# Patient Record
Sex: FEMALE | Race: WHITE | NOT HISPANIC OR LATINO
[De-identification: names, ages, dates, MRNs, and addresses within clinical notes are randomized per-mention and may not be internally consistent; named-entity substitution may affect disease eponyms.]

---

## 2017-01-03 ENCOUNTER — APPOINTMENT (OUTPATIENT)
Dept: OBGYN | Facility: CLINIC | Age: 77
End: 2017-01-03

## 2017-01-04 ENCOUNTER — RESULT REVIEW (OUTPATIENT)
Age: 77
End: 2017-01-04

## 2017-01-05 ENCOUNTER — APPOINTMENT (OUTPATIENT)
Dept: HEMATOLOGY ONCOLOGY | Facility: CLINIC | Age: 77
End: 2017-01-05
Payer: MEDICARE

## 2017-01-05 VITALS
WEIGHT: 144.4 LBS | RESPIRATION RATE: 16 BRPM | SYSTOLIC BLOOD PRESSURE: 155 MMHG | BODY MASS INDEX: 28.2 KG/M2 | TEMPERATURE: 97.9 F | OXYGEN SATURATION: 95 % | HEART RATE: 68 BPM | DIASTOLIC BLOOD PRESSURE: 78 MMHG

## 2017-01-05 PROCEDURE — 99214 OFFICE O/P EST MOD 30 MIN: CPT

## 2017-01-05 RX ORDER — AMLODIPINE BESYLATE 5 MG/1
5 TABLET ORAL DAILY
Refills: 0 | Status: ACTIVE | COMMUNITY

## 2017-11-24 ENCOUNTER — OUTPATIENT (OUTPATIENT)
Dept: OUTPATIENT SERVICES | Facility: HOSPITAL | Age: 77
LOS: 1 days | Discharge: HOME | End: 2017-11-24

## 2017-11-24 DIAGNOSIS — Z85.3 PERSONAL HISTORY OF MALIGNANT NEOPLASM OF BREAST: ICD-10-CM

## 2017-11-27 ENCOUNTER — APPOINTMENT (OUTPATIENT)
Dept: SURGICAL ONCOLOGY | Facility: CLINIC | Age: 77
End: 2017-11-27
Payer: MEDICARE

## 2017-11-27 VITALS
RESPIRATION RATE: 16 BRPM | DIASTOLIC BLOOD PRESSURE: 68 MMHG | WEIGHT: 144 LBS | BODY MASS INDEX: 28.27 KG/M2 | OXYGEN SATURATION: 98 % | HEIGHT: 60 IN | SYSTOLIC BLOOD PRESSURE: 169 MMHG | HEART RATE: 69 BPM

## 2017-11-27 PROCEDURE — 99213 OFFICE O/P EST LOW 20 MIN: CPT

## 2017-11-30 ENCOUNTER — APPOINTMENT (OUTPATIENT)
Dept: HEMATOLOGY ONCOLOGY | Facility: CLINIC | Age: 77
End: 2017-11-30
Payer: MEDICARE

## 2017-11-30 VITALS
BODY MASS INDEX: 27.86 KG/M2 | WEIGHT: 142.64 LBS | OXYGEN SATURATION: 96 % | HEART RATE: 72 BPM | TEMPERATURE: 98.4 F | RESPIRATION RATE: 16 BRPM | SYSTOLIC BLOOD PRESSURE: 178 MMHG | DIASTOLIC BLOOD PRESSURE: 79 MMHG

## 2017-11-30 PROCEDURE — 99214 OFFICE O/P EST MOD 30 MIN: CPT

## 2018-11-15 ENCOUNTER — APPOINTMENT (OUTPATIENT)
Dept: SURGICAL ONCOLOGY | Facility: CLINIC | Age: 78
End: 2018-11-15
Payer: MEDICARE

## 2018-11-15 VITALS
RESPIRATION RATE: 15 BRPM | WEIGHT: 140 LBS | HEIGHT: 60 IN | HEART RATE: 78 BPM | BODY MASS INDEX: 27.48 KG/M2 | DIASTOLIC BLOOD PRESSURE: 77 MMHG | SYSTOLIC BLOOD PRESSURE: 158 MMHG

## 2018-11-15 PROCEDURE — 99213 OFFICE O/P EST LOW 20 MIN: CPT

## 2018-11-16 ENCOUNTER — OUTPATIENT (OUTPATIENT)
Dept: OUTPATIENT SERVICES | Facility: HOSPITAL | Age: 78
LOS: 1 days | Discharge: HOME | End: 2018-11-16

## 2018-11-16 DIAGNOSIS — Z85.3 PERSONAL HISTORY OF MALIGNANT NEOPLASM OF BREAST: ICD-10-CM

## 2018-11-28 ENCOUNTER — APPOINTMENT (OUTPATIENT)
Dept: HEMATOLOGY ONCOLOGY | Facility: CLINIC | Age: 78
End: 2018-11-28
Payer: MEDICARE

## 2018-11-28 VITALS
DIASTOLIC BLOOD PRESSURE: 72 MMHG | TEMPERATURE: 98 F | WEIGHT: 139.99 LBS | OXYGEN SATURATION: 99 % | HEART RATE: 68 BPM | SYSTOLIC BLOOD PRESSURE: 130 MMHG | BODY MASS INDEX: 27.34 KG/M2 | RESPIRATION RATE: 16 BRPM

## 2018-11-28 PROCEDURE — 99214 OFFICE O/P EST MOD 30 MIN: CPT

## 2018-11-28 NOTE — HISTORY OF PRESENT ILLNESS
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 On 5/21/97 she underwent right partial mastectomy with axillary lymph node dissection with pathology revealing lobular carcinoma in situ and focal atypical lobular hyperplasia with 1/20 lymph nodes positive for metastatic carcinoma; extent of disease workup was negative. 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  She was treated with adjuvant Adriamycin/Cytoxan x4 cycles followed by tamoxifen x5 years, then Femara x5 years, completed in October 2008. Since this time she has been followed by close observation. [de-identified] : infiltrating well-differentiated (tubular)carcinoma; microscopic focus of intraductal carcinoma; lobular carcinoma in situ. [de-identified] : CA 27.29 [de-identified] : 76 year old woman here for yearly follow-up for right breast cancer.  Her last office visit was on 12/10/16. She feels very well without any complaints. Patient had a uterine polyp removed 12/21/2016.  Patient reports the pathology was benign.  She reports a good energy level and appetite without any recent weight loss. She denies any recent infections, fever or chills.  She denies any chest pain, SOB, abdominal pain or new bony pain.  She denies any vaginal bleeding.  \par \par She reports she had a complete metabolic profile in 12/2016 prior to surgery.  She is up-to-date with bone density (9/2016), flu shot (10/2016), Pneumovax (7/2015).  She recently saw Dr. Baltazar (11/2016).  She sees PMD every 3 months. She sees gynecology once per year and receives yearly pelvic sonograms (last 10/2016).    \par \par SInce office visit on 1/5/17, she had followup with breast surgeon on 11/27/17. She has good energy level and appetite. She follows with PCP every 3 months. She sees Gynecologist yearly. No fever, shortness of breath, abdominal pain.\par \par Since last office visit on 11/30/17, the patient continues to be monitored by PCP. She had recent lab work on 11/19/18. She has good energy level and good appetite. She was evaluated by breast surgeon on 11/15/18. She denies fever, cough, shortness of breath. She see Gyn yearly. Bone density ordered by PCP.

## 2018-11-28 NOTE — ASSESSMENT
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 On 5/21/97 she underwent right partial mastectomy with axillary lymph node dissection with pathology revealing lobular carcinoma in situ and focal atypical lobular hyperplasia with 1/20 lymph nodes positive for metastatic carcinoma; extent of disease workup was negative. 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 She was treated with adjuvant Adriamycin/Cytoxan x4 cycles followed by tamoxifen x5 years, then Femara x5 years, completed in October 2008. Since this time she has been followed by close observation.\par \par Plan-Continue to observe for evidence of breast cancer recurrence.\par Followup with gynecology yearly.\par Followup with breast surgeon yearly.\par Continue current medications.\par I previously discussed with the patient that there is no longer clinical indication to monitor her tumor markers in the future. She has labwork done at PCP office and follows every 3 months. No need to check CMP at this office.\par Schedule followup office visit in one year. \par \par \par

## 2018-11-28 NOTE — PHYSICAL EXAM
[Normal] : no peripheral adenopathy appreciated [de-identified] : anicteric [de-identified] : RRR, normal S1S2 [de-identified] : warm, no pitting edema [de-identified] : status post bilateral mastectomies with silicone implants, no chest wall lesions

## 2018-11-28 NOTE — REVIEW OF SYSTEMS
[Joint Pain] : joint pain [Joint Stiffness] : joint stiffness [Fever] : no fever [Fatigue] : no fatigue [Mucosal Pain] : no mucosal pain [Lower Ext Edema] : no lower extremity edema [Shortness Of Breath] : no shortness of breath [Cough] : no cough [Abdominal Pain] : no abdominal pain [Vomiting] : no vomiting [Diarrhea] : no diarrhea [Skin Rash] : no skin rash [Dizziness] : no dizziness [Fainting] : no fainting [FreeTextEntry7] : Good appetite [FreeTextEntry9] : mild in fingers, both knees, sometimes ankles secondary to arthritis

## 2019-11-25 ENCOUNTER — APPOINTMENT (OUTPATIENT)
Dept: SURGICAL ONCOLOGY | Facility: CLINIC | Age: 79
End: 2019-11-25
Payer: MEDICARE

## 2019-11-25 VITALS
RESPIRATION RATE: 15 BRPM | SYSTOLIC BLOOD PRESSURE: 153 MMHG | WEIGHT: 140 LBS | HEART RATE: 74 BPM | DIASTOLIC BLOOD PRESSURE: 71 MMHG | BODY MASS INDEX: 27.48 KG/M2 | HEIGHT: 60 IN

## 2019-11-25 PROCEDURE — 99213 OFFICE O/P EST LOW 20 MIN: CPT

## 2019-11-25 NOTE — ASSESSMENT
[FreeTextEntry1] : 2010 PET scan was unremarkable.\par \par She continues to do well clinically.\par \par Since asymptomatic, no additional imaging presently warranted.\par \par We will see her in another year, sooner if needed

## 2019-11-25 NOTE — HISTORY OF PRESENT ILLNESS
[de-identified] : ***Now lives in [place]\par \par 79 year-old lady being seen for followup of RIGHT BREAST CANCER\par \par She is asymptomatic\par \par March 1997: right breast conserving surgery for LN+ stage II cancer.\par Her adjuvant chemotherapy was overseen by Dr. Diann Ram. \par Followed by anti-estrogen therapy\par Her medical oncologist is now Dr. 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 Mir López.\par \par She is BRCA negative.\par \par + Ashkenazi heritage.\par \par No relatives with breast OR ovarian cancer.\par \par Her menarche was at age 13. \par She's had 2 pregnancies, both delivered, the first is 26\par \par Her internist is Dr Lexie NAJERA.\par \par She does not have a pacemaker defibrillator.\par She takes no anticoagulants.\par \par She takes a baby aspirin daily.\par Hypertension is managed with Diovan, metoprolol, Norvasc, and HCTZ\par Lipitor to control hyperlipidemia\par \par GYN: Saw Dr. Tyler MONROE for followup - July 2018 okay\par Her gynecologist was Dr. Cosmo Chavez, a visit in October 2016 led the diagnosis of the uterine polyp, was addressed by  Dr. Kelton Castillo\par \par \par Colonoscopy in May 2016, when she was 76, by Dr. Najera was unremarkable.

## 2019-11-25 NOTE — PHYSICAL EXAM
[Normal] : supple, no neck mass and thyroid not enlarged [Normal Neck Lymph Nodes] : normal neck lymph nodes  [Normal Supraclavicular Lymph Nodes] : normal supraclavicular lymph nodes [Normal Axillary Lymph Nodes] : normal axillary lymph nodes [Normal] : normal appearance, no rash, nodules, vesicles, ulcers, erythema [de-identified] : below [de-identified] : Groins not examined

## 2019-11-25 NOTE — REASON FOR VISIT
[Other: _____] : [unfilled] [Follow-Up Visit] : a follow-up visit for [FreeTextEntry2] : Right breast cancer

## 2019-11-26 ENCOUNTER — OUTPATIENT (OUTPATIENT)
Dept: OUTPATIENT SERVICES | Facility: HOSPITAL | Age: 79
LOS: 1 days | Discharge: HOME | End: 2019-11-26

## 2019-11-26 DIAGNOSIS — Z85.3 PERSONAL HISTORY OF MALIGNANT NEOPLASM OF BREAST: ICD-10-CM

## 2019-12-26 ENCOUNTER — APPOINTMENT (OUTPATIENT)
Dept: HEMATOLOGY ONCOLOGY | Facility: CLINIC | Age: 79
End: 2019-12-26
Payer: MEDICARE

## 2019-12-26 VITALS
OXYGEN SATURATION: 96 % | HEART RATE: 71 BPM | SYSTOLIC BLOOD PRESSURE: 152 MMHG | DIASTOLIC BLOOD PRESSURE: 71 MMHG | RESPIRATION RATE: 16 BRPM | BODY MASS INDEX: 26.69 KG/M2 | TEMPERATURE: 97.9 F | WEIGHT: 136.68 LBS

## 2019-12-26 PROCEDURE — 99214 OFFICE O/P EST MOD 30 MIN: CPT

## 2019-12-26 NOTE — HISTORY OF PRESENT ILLNESS
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 On 5/21/97 she underwent right partial mastectomy with axillary lymph node dissection with pathology revealing lobular carcinoma in situ and focal atypical lobular hyperplasia with 1/20 lymph nodes positive for metastatic carcinoma; extent of disease workup was negative. 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  She was treated with adjuvant Adriamycin/Cytoxan x4 cycles followed by tamoxifen x5 years, then Femara x5 years, completed in October 2008. Since this time she has been followed by close observation. [de-identified] : infiltrating well-differentiated (tubular)carcinoma; microscopic focus of intraductal carcinoma; lobular carcinoma in situ. [de-identified] : CA 27.29 [de-identified] : 79 year old woman here for yearly follow-up for right breast cancer.  Her last office visit was on 12/10/16. She feels very well without any complaints. Patient had a uterine polyp removed 12/21/2016.  Patient reports the pathology was benign.  She reports a good energy level and appetite without any recent weight loss. She denies any recent infections, fever or chills.  She denies any chest pain, SOB, abdominal pain or new bony pain.  She denies any vaginal bleeding.  \par \par She reports she had a complete metabolic profile in 12/2016 prior to surgery.  She is up-to-date with bone density (9/2016), flu shot (10/2016), Pneumovax (7/2015).  She recently saw Dr. Baltazar (11/2016).  She sees PMD every 3 months. She sees gynecology once per year and receives yearly pelvic sonograms (last 10/2016).    \par \par SInce office visit on 1/5/17, she had followup with breast surgeon on 11/27/17. She has good energy level and appetite. She follows with PCP every 3 months. She sees Gynecologist yearly. No fever, shortness of breath, abdominal pain.\par \par Since office visit on 11/30/17, the patient continues to be monitored by PCP. She had recent lab work on 11/19/18. She has good energy level and good appetite. She was evaluated by breast surgeon on 11/15/18. She denies fever, cough, shortness of breath. She see Gyn yearly. Bone density ordered by PCP. \par \par Since last office visit on 11/28/18, the patient continues to be monitored by PCP every 3 months except when in [place]. She had recent lab work on 9/19/19, CBC with diff in normal range. She was evaluated by breast surgeon on 11/25/19. She has good energy level and good appetite. She denies fever, cough, shortness of breath. She see Gyn yearly, last on 10/14/19. Bone density ordered by PCP.

## 2019-12-26 NOTE — PHYSICAL EXAM
[Normal] : no peripheral adenopathy appreciated [de-identified] : anicteric [de-identified] : RRR, normal S1S2 [de-identified] : warm, no pitting edema [de-identified] : status post bilateral mastectomies with silicone implants, no chest wall lesions

## 2019-12-26 NOTE — REVIEW OF SYSTEMS
[Joint Pain] : joint pain [Joint Stiffness] : joint stiffness [Fever] : no fever [Fatigue] : no fatigue [Lower Ext Edema] : no lower extremity edema [Mucosal Pain] : no mucosal pain [Abdominal Pain] : no abdominal pain [Cough] : no cough [Shortness Of Breath] : no shortness of breath [Vomiting] : no vomiting [Diarrhea] : no diarrhea [Skin Rash] : no skin rash [Dizziness] : no dizziness [Fainting] : no fainting [FreeTextEntry7] : Good appetite [FreeTextEntry9] : mild in fingers, both knees, sometimes ankles secondary to arthritis

## 2019-12-26 NOTE — ASSESSMENT
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 On 5/21/97 she underwent right partial mastectomy with axillary lymph node dissection with pathology revealing lobular carcinoma in situ and focal atypical lobular hyperplasia with 1/20 lymph nodes positive for metastatic carcinoma; extent of disease workup was negative. 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 She was treated with adjuvant Adriamycin/Cytoxan x4 cycles followed by tamoxifen x5 years, then Femara x5 years, completed in October 2008. Since this time she has been followed by close observation.\par \par Plan-Continue to observe for evidence of breast cancer recurrence.\par Followup with gynecology yearly.\par Followup with breast surgeon yearly.\par Continue current medications.\par I previously discussed with the patient that there is no longer clinical indication to monitor her tumor markers in the future. She has labwork done at PCP office and follows every 3 months. No need to check CMP at this office.\par Schedule followup office visit in one year. \par \par \par

## 2021-06-21 ENCOUNTER — APPOINTMENT (OUTPATIENT)
Dept: SURGICAL ONCOLOGY | Facility: CLINIC | Age: 81
End: 2021-06-21
Payer: MEDICARE

## 2021-06-21 VITALS
BODY MASS INDEX: 26.68 KG/M2 | SYSTOLIC BLOOD PRESSURE: 153 MMHG | DIASTOLIC BLOOD PRESSURE: 74 MMHG | RESPIRATION RATE: 16 BRPM | HEART RATE: 71 BPM | WEIGHT: 145 LBS | OXYGEN SATURATION: 95 % | HEIGHT: 62 IN

## 2021-06-21 PROCEDURE — 99213 OFFICE O/P EST LOW 20 MIN: CPT

## 2021-06-21 NOTE — PHYSICAL EXAM
[Normal] : supple, no neck mass and thyroid not enlarged [Normal Neck Lymph Nodes] : normal neck lymph nodes  [Normal Supraclavicular Lymph Nodes] : normal supraclavicular lymph nodes [Normal Axillary Lymph Nodes] : normal axillary lymph nodes [Normal] : normal appearance, no rash, nodules, vesicles, ulcers, erythema [de-identified] : Groins not examined [de-identified] : below

## 2021-06-21 NOTE — HISTORY OF PRESENT ILLNESS
[de-identified] : ***Now lives in [place].\par \par Last seen November 2019.\par \par \par 80 year-old lady being seen for followup of RIGHT BREAST CANCER\par \par She is asymptomatic\par \par March 1997: right breast conserving surgery for LN+ stage II cancer.\par + Adjuvant chemotherapy: Dr. Diann Ram. \par Followed by anti-estrogen therapy\par Her medical oncologist is now Dr. 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 Mir López.\par \par She is BRCA negative.\par \par + Ashkenazi heritage.\par \par No relatives with breast OR ovarian cancer.\par \par \par \par Her menarche was at age 13. \par She's had 2 pregnancies, both delivered, the first is 25\par \par \par Her internist is Dr Lexie NAJERA.\par \par She does not have a pacemaker defibrillator.\par She takes no anticoagulants.\par \par She takes a baby aspirin daily.\par Hypertension is managed with Diovan, metoprolol, Norvasc, and HCTZ\par Lipitor to control hyperlipidemia\par \par \par GYN: Saw Dr. Tyler MONROE for followup - July 2018 okay\par Her gynecologist was Dr. Cosmo Chavez, a visit in October 2016 led the diagnosis of the uterine polyp, was addressed by  Dr. Kelton Castillo.\par She will be scheduling a follow-up visit\par \par \par Colonoscopy in May 2016, when she was 76, by Dr. Najera was unremarkable.\par No specific follow-up needed since she is asymptomatic, over 76, without a personal or family history of CRC

## 2021-06-21 NOTE — REASON FOR VISIT
[Follow-Up Visit] : a follow-up visit for [Other: _____] : [unfilled] [FreeTextEntry2] : Right breast cancer treated initially with breast conservation, then subsequent bilateral mastectomies

## 2021-06-21 NOTE — ASSESSMENT
[FreeTextEntry1] : 2010 PET scan was unremarkable.\par \par \par I suggested bilateral breast ultrasounds for surveillance of her implants.\par Prescriptions entered.\par \par \par She continues to do well clinically.\par \par Since asymptomatic, no additional imaging presently warranted.\par \par We will see her in another year, sooner if needed\par \par

## 2021-07-01 ENCOUNTER — RESULT REVIEW (OUTPATIENT)
Age: 81
End: 2021-07-01

## 2021-07-01 ENCOUNTER — OUTPATIENT (OUTPATIENT)
Dept: OUTPATIENT SERVICES | Facility: HOSPITAL | Age: 81
LOS: 1 days | End: 2021-07-01
Payer: MEDICARE

## 2021-07-01 ENCOUNTER — APPOINTMENT (OUTPATIENT)
Dept: ULTRASOUND IMAGING | Facility: CLINIC | Age: 81
End: 2021-07-01
Payer: MEDICARE

## 2021-07-01 DIAGNOSIS — Z00.8 ENCOUNTER FOR OTHER GENERAL EXAMINATION: ICD-10-CM

## 2021-07-01 PROCEDURE — 76641 ULTRASOUND BREAST COMPLETE: CPT

## 2021-07-01 PROCEDURE — 76641 ULTRASOUND BREAST COMPLETE: CPT | Mod: 26,50

## 2021-08-25 ENCOUNTER — OUTPATIENT (OUTPATIENT)
Dept: OUTPATIENT SERVICES | Facility: HOSPITAL | Age: 81
LOS: 1 days | Discharge: HOME | End: 2021-08-25

## 2021-08-25 DIAGNOSIS — Z85.3 PERSONAL HISTORY OF MALIGNANT NEOPLASM OF BREAST: ICD-10-CM

## 2021-08-26 ENCOUNTER — RESULT REVIEW (OUTPATIENT)
Age: 81
End: 2021-08-26

## 2021-08-26 ENCOUNTER — APPOINTMENT (OUTPATIENT)
Dept: HEMATOLOGY ONCOLOGY | Facility: CLINIC | Age: 81
End: 2021-08-26
Payer: MEDICARE

## 2021-08-26 VITALS
HEIGHT: 62.01 IN | HEART RATE: 68 BPM | TEMPERATURE: 97.7 F | SYSTOLIC BLOOD PRESSURE: 154 MMHG | BODY MASS INDEX: 25.8 KG/M2 | WEIGHT: 141.98 LBS | OXYGEN SATURATION: 95 % | DIASTOLIC BLOOD PRESSURE: 71 MMHG | RESPIRATION RATE: 16 BRPM

## 2021-08-26 LAB
BASOPHILS # BLD AUTO: 0.1 K/UL — SIGNIFICANT CHANGE UP (ref 0–0.2)
BASOPHILS NFR BLD AUTO: 1 % — SIGNIFICANT CHANGE UP (ref 0–2)
EOSINOPHIL # BLD AUTO: 0.26 K/UL — SIGNIFICANT CHANGE UP (ref 0–0.5)
EOSINOPHIL NFR BLD AUTO: 2.6 % — SIGNIFICANT CHANGE UP (ref 0–6)
HCT VFR BLD CALC: 41.4 % — SIGNIFICANT CHANGE UP (ref 34.5–45)
HGB BLD-MCNC: 14.2 G/DL — SIGNIFICANT CHANGE UP (ref 11.5–15.5)
IMM GRANULOCYTES NFR BLD AUTO: 0.9 % — SIGNIFICANT CHANGE UP (ref 0–1.5)
LYMPHOCYTES # BLD AUTO: 2.41 K/UL — SIGNIFICANT CHANGE UP (ref 1–3.3)
LYMPHOCYTES # BLD AUTO: 24.2 % — SIGNIFICANT CHANGE UP (ref 13–44)
MCHC RBC-ENTMCNC: 30.7 PG — SIGNIFICANT CHANGE UP (ref 27–34)
MCHC RBC-ENTMCNC: 34.3 G/DL — SIGNIFICANT CHANGE UP (ref 32–36)
MCV RBC AUTO: 89.6 FL — SIGNIFICANT CHANGE UP (ref 80–100)
MONOCYTES # BLD AUTO: 1.13 K/UL — HIGH (ref 0–0.9)
MONOCYTES NFR BLD AUTO: 11.3 % — SIGNIFICANT CHANGE UP (ref 2–14)
NEUTROPHILS # BLD AUTO: 5.97 K/UL — SIGNIFICANT CHANGE UP (ref 1.8–7.4)
NEUTROPHILS NFR BLD AUTO: 60 % — SIGNIFICANT CHANGE UP (ref 43–77)
NRBC # BLD: 0 /100 WBCS — SIGNIFICANT CHANGE UP (ref 0–0)
PLATELET # BLD AUTO: 277 K/UL — SIGNIFICANT CHANGE UP (ref 150–400)
RBC # BLD: 4.62 M/UL — SIGNIFICANT CHANGE UP (ref 3.8–5.2)
RBC # FLD: 13.1 % — SIGNIFICANT CHANGE UP (ref 10.3–14.5)
WBC # BLD: 9.96 K/UL — SIGNIFICANT CHANGE UP (ref 3.8–10.5)
WBC # FLD AUTO: 9.96 K/UL — SIGNIFICANT CHANGE UP (ref 3.8–10.5)

## 2021-08-26 PROCEDURE — 99213 OFFICE O/P EST LOW 20 MIN: CPT

## 2021-08-26 NOTE — HISTORY OF PRESENT ILLNESS
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 On 5/21/97 she underwent right partial mastectomy with axillary lymph node dissection with pathology revealing lobular carcinoma in situ and focal atypical lobular hyperplasia with 1/20 lymph nodes positive for metastatic carcinoma; extent of disease workup was negative. 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  She was treated with adjuvant Adriamycin/Cytoxan x4 cycles followed by tamoxifen x5 years, then Femara x5 years, completed in October 2008. Since this time she has been followed by close observation. [de-identified] : infiltrating well-differentiated (tubular)carcinoma; microscopic focus of intraductal carcinoma; lobular carcinoma in situ. [de-identified] : CA 27.29 [de-identified] : 81 year old woman here for yearly follow-up for right breast cancer.  Her last office visit was on 12/10/16. She feels very well without any complaints. Patient had a uterine polyp removed 12/21/2016.  Patient reports the pathology was benign.  She reports a good energy level and appetite without any recent weight loss. She denies any recent infections, fever or chills.  She denies any chest pain, SOB, abdominal pain or new bony pain.  She denies any vaginal bleeding.  \par \par She reports she had a complete metabolic profile in 12/2016 prior to surgery.  She is up-to-date with bone density (9/2016), flu shot (10/2016), Pneumovax (7/2015).  She recently saw Dr. Baltazar (11/2016).  She sees PMD every 3 months. She sees gynecology once per year and receives yearly pelvic sonograms (last 10/2016).    \par \par SInce office visit on 1/5/17, she had followup with breast surgeon on 11/27/17. She has good energy level and appetite. She follows with PCP every 3 months. She sees Gynecologist yearly. No fever, shortness of breath, abdominal pain.\par \par Since office visit on 11/30/17, the patient continues to be monitored by PCP. She had recent lab work on 11/19/18. She has good energy level and good appetite. She was evaluated by breast surgeon on 11/15/18. She denies fever, cough, shortness of breath. She see Gyn yearly. Bone density ordered by PCP. \par \par Since office visit on 11/28/18, the patient continues to be monitored by PCP every 3 months except when in [place]. She had recent lab work on 9/19/19, CBC with diff in normal range. She was evaluated by breast surgeon on 11/25/19. She has good energy level and good appetite. She denies fever, cough, shortness of breath. She see Gyn yearly, last on 10/14/19. Bone density ordered by PCP. \par \par Since last office visit on 12/26/19, she has good energy level and good appetite. She denies fever, cough, shortness of breath. Seen by Surg Onc on 6/21/21.\par Seen by Gyn on 8/3/21 and had pap, she states exam was normal. Colonoscopy in May 2016, when she was 76, by Dr. Najera was unremarkable.No specific follow-up needed since she is asymptomatic, over 76, without a personal or family history of CRC. She had bone density a few weeks ago. \par

## 2021-08-26 NOTE — ASSESSMENT
[FreeTextEntry1] : \par The patient is a 81-year-old woman diagnosed with stage II infiltrating ductal carcinoma of the right breast, ER positive, [place] positive in May 1997, initial breast biopsy on 5/1/97 revealing infiltrating well-differentiated carcinoma (0.9 cm). On 5/21/97 she underwent right partial mastectomy with axillary lymph node dissection with pathology revealing lobular carcinoma in situ and focal atypical lobular hyperplasia with 1/20 lymph nodes positive for metastatic carcinoma; extent of disease workup was negative. 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 She was treated with adjuvant Adriamycin/Cytoxan x4 cycles followed by tamoxifen x5 years, then Femara x5 years, completed in October 2008. Since this time she has been followed by close observation.\par \par Plan-Continue to observe for evidence of breast cancer recurrence.\par Followup with gynecology yearly.\par Followup with breast surgeon yearly.\par Continue current medications.\par I previously discussed with the patient that there is no longer clinical indication to monitor her tumor markers in the future. She has labwork done at PCP office and follows every 3 months. No need to check CMP at this office.\par Schedule followup office visit in one year. \par \par \par

## 2021-08-26 NOTE — REVIEW OF SYSTEMS
[Joint Pain] : joint pain [Joint Stiffness] : joint stiffness [Fever] : no fever [Chills] : no chills [Fatigue] : no fatigue [Mucosal Pain] : no mucosal pain [Lower Ext Edema] : no lower extremity edema [Shortness Of Breath] : no shortness of breath [Cough] : no cough [Abdominal Pain] : no abdominal pain [Vomiting] : no vomiting [Diarrhea] : no diarrhea [Skin Rash] : no skin rash [Dizziness] : no dizziness [Fainting] : no fainting [Easy Bleeding] : no tendency for easy bleeding [Easy Bruising] : no tendency for easy bruising [FreeTextEntry7] : Good appetite [FreeTextEntry9] : mild in fingers, both knees, sometimes ankles secondary to arthritis

## 2021-08-26 NOTE — PHYSICAL EXAM
[Normal] : no peripheral adenopathy appreciated [de-identified] : anicteric [de-identified] : RRR, normal S1S2 [de-identified] : warm, no pitting edema [de-identified] : status post bilateral mastectomies with silicone implants, no chest wall lesions

## 2022-06-26 RX ORDER — HYDROCHLOROTHIAZIDE 12.5 MG/1
12.5 CAPSULE ORAL
Qty: 90 | Refills: 0 | Status: ACTIVE | COMMUNITY
Start: 2022-04-08

## 2022-06-27 ENCOUNTER — APPOINTMENT (OUTPATIENT)
Dept: SURGICAL ONCOLOGY | Facility: CLINIC | Age: 82
End: 2022-06-27

## 2022-06-27 VITALS
BODY MASS INDEX: 25.21 KG/M2 | SYSTOLIC BLOOD PRESSURE: 152 MMHG | OXYGEN SATURATION: 96 % | WEIGHT: 137 LBS | DIASTOLIC BLOOD PRESSURE: 75 MMHG | HEIGHT: 62 IN | HEART RATE: 74 BPM | RESPIRATION RATE: 16 BRPM | TEMPERATURE: 96.8 F

## 2022-06-27 PROCEDURE — 99214 OFFICE O/P EST MOD 30 MIN: CPT

## 2022-06-27 NOTE — REASON FOR VISIT
[Follow-Up Visit] : a follow-up visit for [Other: _____] : [unfilled] [FreeTextEntry2] : Right breast cancer treated with bilateral mastectomies

## 2022-06-27 NOTE — ASSESSMENT
[FreeTextEntry1] : 2010 PET scan was unremarkable.\par \par \par July 2021:\par Bilateral breast ultrasound at 450: BI-RADS 2.\par Small amount of fluid around the implants.\par We will repeat presently\par Prescriptions entered.\par \par \par She continues to do well clinically.\par \par \par We will see her in another year, sooner if needed\par \par

## 2022-06-27 NOTE — PHYSICAL EXAM
[Normal] : supple, no neck mass and thyroid not enlarged [Normal Neck Lymph Nodes] : normal neck lymph nodes  [Normal Supraclavicular Lymph Nodes] : normal supraclavicular lymph nodes [Normal Axillary Lymph Nodes] : normal axillary lymph nodes [Normal] : normal appearance, no rash, nodules, vesicles, ulcers, erythema [de-identified] : Groins not examined [de-identified] : below

## 2022-06-27 NOTE — HISTORY OF PRESENT ILLNESS
[de-identified] : ***Now lives in [place].\par \par 81 year-old lady being seen for followup of RIGHT BREAST CANCER\par \par She is asymptomatic\par \par March 1997: right breast conserving surgery for LN+ stage II cancer.\par + Adjuvant chemotherapy: Dr. Diann Ram. \par Followed by anti-estrogen therapy\par Her medical oncologist is now Dr. 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 Mir López.\par \par She is BRCA negative.\par \par + Ashkenazi heritage.\par \par No relatives with breast OR ovarian cancer.\par \par \par \par Her menarche was at age 13. \par She's had 2 pregnancies, both delivered, the first is 26\par \par \par Her internist is Dr Lexie NAJERA.\par \par She does not have a pacemaker defibrillator.\par She takes no anticoagulants.\par \par She takes a baby aspirin daily.\par Hypertension is managed with Diovan, metoprolol, Norvasc, and HCTZ\par Lipitor to control hyperlipidemia.\par \par Cardiology: Dr. Riki NAJERA\par \par \par GYN: Saw Dr. Tyler MONROE for followup -August 2021 okay\par Her gynecologist was Dr. Cosmo Chavez, a visit in October 2016 led the diagnosis of the uterine polyp, was addressed by  Dr. Kelton Castillo.\par \par \par \par Colonoscopy in May 2016, when she was 76, by Dr. Najera was unremarkable.\par No specific follow-up needed since she is asymptomatic, over 76, without a personal or family history of CRC

## 2022-07-18 ENCOUNTER — RESULT REVIEW (OUTPATIENT)
Age: 82
End: 2022-07-18

## 2022-07-19 ENCOUNTER — APPOINTMENT (OUTPATIENT)
Dept: ULTRASOUND IMAGING | Facility: CLINIC | Age: 82
End: 2022-07-19

## 2022-07-19 ENCOUNTER — OUTPATIENT (OUTPATIENT)
Dept: OUTPATIENT SERVICES | Facility: HOSPITAL | Age: 82
LOS: 1 days | End: 2022-07-19
Payer: COMMERCIAL

## 2022-07-19 DIAGNOSIS — Z85.3 PERSONAL HISTORY OF MALIGNANT NEOPLASM OF BREAST: ICD-10-CM

## 2022-07-19 PROCEDURE — 76641 ULTRASOUND BREAST COMPLETE: CPT

## 2022-07-19 PROCEDURE — 76641 ULTRASOUND BREAST COMPLETE: CPT | Mod: 26,50

## 2022-08-24 ENCOUNTER — OUTPATIENT (OUTPATIENT)
Dept: OUTPATIENT SERVICES | Facility: HOSPITAL | Age: 82
LOS: 1 days | Discharge: HOME | End: 2022-08-24

## 2022-08-24 DIAGNOSIS — Z85.3 PERSONAL HISTORY OF MALIGNANT NEOPLASM OF BREAST: ICD-10-CM

## 2022-08-25 ENCOUNTER — APPOINTMENT (OUTPATIENT)
Dept: HEMATOLOGY ONCOLOGY | Facility: CLINIC | Age: 82
End: 2022-08-25

## 2022-08-25 ENCOUNTER — RESULT REVIEW (OUTPATIENT)
Age: 82
End: 2022-08-25

## 2022-08-25 VITALS
SYSTOLIC BLOOD PRESSURE: 135 MMHG | DIASTOLIC BLOOD PRESSURE: 65 MMHG | OXYGEN SATURATION: 96 % | RESPIRATION RATE: 16 BRPM | TEMPERATURE: 97 F | WEIGHT: 138.89 LBS | HEART RATE: 64 BPM | BODY MASS INDEX: 25.4 KG/M2

## 2022-08-25 LAB
BASOPHILS # BLD AUTO: 0.08 K/UL — SIGNIFICANT CHANGE UP (ref 0–0.2)
BASOPHILS NFR BLD AUTO: 0.9 % — SIGNIFICANT CHANGE UP (ref 0–2)
EOSINOPHIL # BLD AUTO: 0.2 K/UL — SIGNIFICANT CHANGE UP (ref 0–0.5)
EOSINOPHIL NFR BLD AUTO: 2.3 % — SIGNIFICANT CHANGE UP (ref 0–6)
HCT VFR BLD CALC: 43.6 % — SIGNIFICANT CHANGE UP (ref 34.5–45)
HGB BLD-MCNC: 14.7 G/DL — SIGNIFICANT CHANGE UP (ref 11.5–15.5)
IMM GRANULOCYTES NFR BLD AUTO: 0.4 % — SIGNIFICANT CHANGE UP (ref 0–1.5)
LYMPHOCYTES # BLD AUTO: 2.18 K/UL — SIGNIFICANT CHANGE UP (ref 1–3.3)
LYMPHOCYTES # BLD AUTO: 25.5 % — SIGNIFICANT CHANGE UP (ref 13–44)
MCHC RBC-ENTMCNC: 30.8 PG — SIGNIFICANT CHANGE UP (ref 27–34)
MCHC RBC-ENTMCNC: 33.7 G/DL — SIGNIFICANT CHANGE UP (ref 32–36)
MCV RBC AUTO: 91.4 FL — SIGNIFICANT CHANGE UP (ref 80–100)
MONOCYTES # BLD AUTO: 0.87 K/UL — SIGNIFICANT CHANGE UP (ref 0–0.9)
MONOCYTES NFR BLD AUTO: 10.2 % — SIGNIFICANT CHANGE UP (ref 2–14)
NEUTROPHILS # BLD AUTO: 5.2 K/UL — SIGNIFICANT CHANGE UP (ref 1.8–7.4)
NEUTROPHILS NFR BLD AUTO: 60.7 % — SIGNIFICANT CHANGE UP (ref 43–77)
NRBC # BLD: 0 /100 WBCS — SIGNIFICANT CHANGE UP (ref 0–0)
PLATELET # BLD AUTO: 306 K/UL — SIGNIFICANT CHANGE UP (ref 150–400)
RBC # BLD: 4.77 M/UL — SIGNIFICANT CHANGE UP (ref 3.8–5.2)
RBC # FLD: 13.1 % — SIGNIFICANT CHANGE UP (ref 10.3–14.5)
WBC # BLD: 8.56 K/UL — SIGNIFICANT CHANGE UP (ref 3.8–10.5)
WBC # FLD AUTO: 8.56 K/UL — SIGNIFICANT CHANGE UP (ref 3.8–10.5)

## 2022-08-25 PROCEDURE — 99213 OFFICE O/P EST LOW 20 MIN: CPT

## 2022-08-25 NOTE — PHYSICAL EXAM
[Normal] : normoactive bowel sounds, soft and nontender, no hepatosplenomegaly or masses appreciated [de-identified] : anicteric [de-identified] : RRR, normal S1S2 [de-identified] : warm, no pitting edema [de-identified] : status post bilateral mastectomies with silicone implants, no chest wall lesions [de-identified] : no cervical/SCV/axillary adenopathy [de-identified] : A & O x 4

## 2022-08-25 NOTE — ASSESSMENT
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 On 5/21/97 she underwent right partial mastectomy with axillary lymph node dissection with pathology revealing lobular carcinoma in situ and focal atypical lobular hyperplasia with 1/20 lymph nodes positive for metastatic carcinoma; extent of disease workup was negative. 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 She was treated with adjuvant Adriamycin/Cytoxan x4 cycles followed by tamoxifen x5 years, then Femara x5 years, completed in October 2008. Since this time she has been followed by close observation.\par \par Plan-Continue to observe for evidence of breast cancer recurrence.\par Followup with gynecology yearly.\par Followup with breast surgeon yearly.\par Continue current medications.\par I previously discussed with the patient that there is no longer clinical indication to monitor her tumor markers in the future. She has labwork done at PCP office and follows every 3 months. No need to check labs at this office after today.\par Schedule followup office visit in one year. \par \par \par

## 2022-08-25 NOTE — REVIEW OF SYSTEMS
[Joint Pain] : joint pain [Joint Stiffness] : joint stiffness [Fever] : no fever [Chills] : no chills [Fatigue] : no fatigue [Mucosal Pain] : no mucosal pain [Lower Ext Edema] : no lower extremity edema [Shortness Of Breath] : no shortness of breath [Cough] : no cough [Abdominal Pain] : no abdominal pain [Vomiting] : no vomiting [Diarrhea] : no diarrhea [Skin Rash] : no skin rash [Dizziness] : no dizziness [Fainting] : no fainting [Easy Bleeding] : no tendency for easy bleeding [Easy Bruising] : no tendency for easy bruising [FreeTextEntry7] : Good appetite [FreeTextEntry9] : mild in fingers, both knees, sometimes ankles and feet secondary to arthritis

## 2022-08-25 NOTE — HISTORY OF PRESENT ILLNESS
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 On 5/21/97 she underwent right partial mastectomy with axillary lymph node dissection with pathology revealing lobular carcinoma in situ and focal atypical lobular hyperplasia with 1/20 lymph nodes positive for metastatic carcinoma; extent of disease workup was negative. 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  She was treated with adjuvant Adriamycin/Cytoxan x4 cycles followed by tamoxifen x5 years, then Femara x5 years, completed in October 2008. Since this time she has been followed by close observation. [de-identified] : infiltrating well-differentiated (tubular)carcinoma; microscopic focus of intraductal carcinoma; lobular carcinoma in situ. [de-identified] : CA 27.29 [de-identified] : 81 year old woman here for yearly follow-up for right breast cancer.  Her last office visit was on 12/10/16. She feels very well without any complaints. Patient had a uterine polyp removed 12/21/2016.  Patient reports the pathology was benign.  She reports a good energy level and appetite without any recent weight loss. She denies any recent infections, fever or chills.  She denies any chest pain, SOB, abdominal pain or new bony pain.  She denies any vaginal bleeding.  \par \par She reports she had a complete metabolic profile in 12/2016 prior to surgery.  She is up-to-date with bone density (9/2016), flu shot (10/2016), Pneumovax (7/2015).  She recently saw Dr. Baltazar (11/2016).  She sees PMD every 3 months. She sees gynecology once per year and receives yearly pelvic sonograms (last 10/2016).    \par \par SInce office visit on 1/5/17, she had followup with breast surgeon on 11/27/17. She has good energy level and appetite. She follows with PCP every 3 months. She sees Gynecologist yearly. No fever, shortness of breath, abdominal pain.\par \par Since office visit on 11/30/17, the patient continues to be monitored by PCP. She had recent lab work on 11/19/18. She has good energy level and good appetite. She was evaluated by breast surgeon on 11/15/18. She denies fever, cough, shortness of breath. She see Gyn yearly. Bone density ordered by PCP. \par \par Since office visit on 11/28/18, the patient continues to be monitored by PCP every 3 months except when in [place]. She had recent lab work on 9/19/19, CBC with diff in normal range. She was evaluated by breast surgeon on 11/25/19. She has good energy level and good appetite. She denies fever, cough, shortness of breath. She see Gyn yearly, last on 10/14/19. Bone density ordered by PCP. \par \par Since office visit on 12/26/19, she has good energy level and good appetite. She denies fever, cough, shortness of breath. Seen by Surg Onc on 6/21/21.\par Seen by Gyn on 8/3/21 and had pap, she states exam was normal. Colonoscopy in May 2016, when she was 76, by Dr. Najera was unremarkable.No specific follow-up needed since she is asymptomatic, over 76, without a personal or family history of CRC. She had bone density a few weeks ago. \par \par 08/25/22:\par Since last office visit on 8/26/21, she has good energy level and good appetite.  She underwent bilateral breast ultrasound(7/19/22)- no evidence of malignancy.  Seen by Surg Onc on 7/27/22. She has Gyn appointment in October 2022. She follows with PCP every 3 months, this is also her GI physician who informed her that colonoscopy not required- see history above. She denies fever, cough, shortness of breath. \par

## 2022-08-26 LAB
ALBUMIN SERPL ELPH-MCNC: 4.5 G/DL
ALP BLD-CCNC: 59 U/L
ALT SERPL-CCNC: 20 U/L
ANION GAP SERPL CALC-SCNC: 12 MMOL/L
AST SERPL-CCNC: 22 U/L
BILIRUB SERPL-MCNC: 1.2 MG/DL
BUN SERPL-MCNC: 20 MG/DL
CALCIUM SERPL-MCNC: 10.1 MG/DL
CHLORIDE SERPL-SCNC: 102 MMOL/L
CO2 SERPL-SCNC: 28 MMOL/L
CREAT SERPL-MCNC: 0.99 MG/DL
EGFR: 57 ML/MIN/1.73M2
GLUCOSE SERPL-MCNC: 107 MG/DL
POTASSIUM SERPL-SCNC: 3.9 MMOL/L
PROT SERPL-MCNC: 7.1 G/DL
SODIUM SERPL-SCNC: 142 MMOL/L

## 2023-07-19 ENCOUNTER — RESULT REVIEW (OUTPATIENT)
Age: 83
End: 2023-07-19

## 2023-07-19 ENCOUNTER — OUTPATIENT (OUTPATIENT)
Dept: OUTPATIENT SERVICES | Facility: HOSPITAL | Age: 83
LOS: 1 days | End: 2023-07-19
Payer: MEDICARE

## 2023-07-19 ENCOUNTER — APPOINTMENT (OUTPATIENT)
Dept: ULTRASOUND IMAGING | Facility: CLINIC | Age: 83
End: 2023-07-19
Payer: MEDICARE

## 2023-07-19 DIAGNOSIS — Z00.8 ENCOUNTER FOR OTHER GENERAL EXAMINATION: ICD-10-CM

## 2023-07-19 PROCEDURE — 76641 ULTRASOUND BREAST COMPLETE: CPT | Mod: 26,50,GY

## 2023-07-19 PROCEDURE — 76641 ULTRASOUND BREAST COMPLETE: CPT

## 2023-07-24 ENCOUNTER — APPOINTMENT (OUTPATIENT)
Dept: SURGICAL ONCOLOGY | Facility: CLINIC | Age: 83
End: 2023-07-24
Payer: MEDICARE

## 2023-07-24 VITALS
SYSTOLIC BLOOD PRESSURE: 161 MMHG | HEIGHT: 61 IN | DIASTOLIC BLOOD PRESSURE: 73 MMHG | HEART RATE: 58 BPM | RESPIRATION RATE: 16 BRPM | WEIGHT: 136 LBS | OXYGEN SATURATION: 96 % | BODY MASS INDEX: 25.68 KG/M2

## 2023-07-24 PROCEDURE — 99214 OFFICE O/P EST MOD 30 MIN: CPT

## 2023-07-24 RX ORDER — DIAZEPAM 5 MG/1
5 TABLET ORAL
Qty: 3 | Refills: 1 | Status: ACTIVE | COMMUNITY
Start: 2023-07-24 | End: 1900-01-01

## 2023-08-04 ENCOUNTER — APPOINTMENT (OUTPATIENT)
Dept: MRI IMAGING | Facility: CLINIC | Age: 83
End: 2023-08-04
Payer: MEDICARE

## 2023-08-04 PROCEDURE — A9585: CPT

## 2023-08-04 PROCEDURE — 77049 MRI BREAST C-+ W/CAD BI: CPT

## 2023-08-11 NOTE — HISTORY OF PRESENT ILLNESS
[de-identified] : ***Now lives in [place].\par \par 83 year-old lady being seen for followup of RIGHT BREAST CANCER\par \par She is asymptomatic\par \par March 1997: right breast conserving surgery for LN+ stage II cancer.\par + Adjuvant chemotherapy: Dr. Diann Ram. \par Followed by anti-estrogen therapy\par Her medical oncologist is now Dr. 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 Mir López.\par \par She is BRCA negative.\par \par + Ashkenazi heritage.\par \par No relatives with breast OR ovarian cancer.\par \par + Family history of malignancy.\par Father: Lymphoma.\par A paternal second cousin also had cancer, but no other details are available\par \par \par \par Her menarche was at age 13. \par She's had 2 pregnancies, both delivered, the first is 25\par \par \par Her internist is Dr Lexie NAEJRA.\par \par She does not have a pacemaker defibrillator.\par She takes no anticoagulants.\par \par She takes a baby aspirin daily.\par Hypertension is managed with Diovan, metoprolol, and Norvasc.\par Lipitor to control hyperlipidemia.\par \par Cardiology: Dr. Riki NAJERA\par \par \par GYN: Dr. Tyler MONROE\par July 2023 visit was unremarkable\par Her gynecologist was Dr. Cosmo Chavez, a visit in October 2016 led the diagnosis of the uterine polyp, was addressed by  Dr. Kelton Castillo.\par \par \par Colonoscopy in May 2016, when she was 76, by Dr. Najera was unremarkable.\par No specific follow-up needed since she is asymptomatic, over 76, without a personal or family history of CRC

## 2023-08-11 NOTE — ASSESSMENT
[FreeTextEntry1] : 2010 PET scan was unremarkable.   July 2023: Bilateral breast sonograms at [place]: BI-RADS 2. + Bilateral monica-implant fluid, right > left.  I have suggested a breast MRI. Prescription entered.  67   She continues to do well clinically.   We will see her in another year, sooner if needed   8/11/2023. She and I spoke on the phone. August 4, 2023, she had an MRI of her breast at [facility]: BI-RADS 2. There is a trace amount of fluid around her implants bilaterally, but nothing worrisome. She will continue annual follow-up with the Caregiver

## 2023-08-11 NOTE — PHYSICAL EXAM
[Normal] : supple, no neck mass and thyroid not enlarged [Normal Neck Lymph Nodes] : normal neck lymph nodes  [Normal Axillary Lymph Nodes] : normal axillary lymph nodes [Normal Supraclavicular Lymph Nodes] : normal supraclavicular lymph nodes [Normal] : normal appearance, no rash, nodules, vesicles, ulcers, erythema [de-identified] : Groins not examined [de-identified] : below

## 2023-08-23 ENCOUNTER — OUTPATIENT (OUTPATIENT)
Dept: OUTPATIENT SERVICES | Facility: HOSPITAL | Age: 83
LOS: 1 days | Discharge: HOME | End: 2023-08-23

## 2023-08-23 DIAGNOSIS — Z85.3 PERSONAL HISTORY OF MALIGNANT NEOPLASM OF BREAST: ICD-10-CM

## 2023-08-31 ENCOUNTER — APPOINTMENT (OUTPATIENT)
Dept: HEMATOLOGY ONCOLOGY | Facility: CLINIC | Age: 83
End: 2023-08-31
Payer: MEDICARE

## 2023-08-31 VITALS
WEIGHT: 135 LBS | TEMPERATURE: 97.2 F | BODY MASS INDEX: 25.51 KG/M2 | OXYGEN SATURATION: 97 % | RESPIRATION RATE: 16 BRPM | HEART RATE: 51 BPM | DIASTOLIC BLOOD PRESSURE: 60 MMHG | SYSTOLIC BLOOD PRESSURE: 175 MMHG

## 2023-08-31 DIAGNOSIS — Z85.3 PERSONAL HISTORY OF MALIGNANT NEOPLASM OF BREAST: ICD-10-CM

## 2023-08-31 PROCEDURE — 99213 OFFICE O/P EST LOW 20 MIN: CPT

## 2023-08-31 NOTE — ASSESSMENT
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 On 5/21/97 she underwent right partial mastectomy with axillary lymph node dissection with pathology revealing lobular carcinoma in situ and focal atypical lobular hyperplasia with 1/20 lymph nodes positive for metastatic carcinoma; extent of disease workup was negative. 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 She was treated with adjuvant Adriamycin/Cytoxan x4 cycles followed by tamoxifen x5 years, then Femara x5 years, completed in October 2008. Since this time she has been followed by close observation.  Plan-Continue to observe for evidence of breast cancer recurrence. Followup with gynecology yearly. Followup with breast surgeon yearly. Continue current medications. I previously discussed with the patient that there is no longer clinical indication to monitor her tumor markers in the future. She has labwork done at PCP office and follows every 3 months. No need to check labs at this office after today. She will followup with Dr. Baltazar from now on. If necessary, she can also be followed in the breast cancer survivorship program at [facility].

## 2023-08-31 NOTE — PHYSICAL EXAM
[Normal] : pharynx is unremarkable, moist mucus membrane, no oral lesions [de-identified] : anicteric [de-identified] : RRR, normal S1S2 [de-identified] : warm, no pitting edema [de-identified] : status post bilateral mastectomies with silicone implants, no chest wall lesions [de-identified] : no cervical/SCV/axillary adenopathy [de-identified] : A & O x 4

## 2023-08-31 NOTE — HISTORY OF PRESENT ILLNESS
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 On 5/21/97 she underwent right partial mastectomy with axillary lymph node dissection with pathology revealing lobular carcinoma in situ and focal atypical lobular hyperplasia with 1/20 lymph nodes positive for metastatic carcinoma; extent of disease workup was negative. 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  She was treated with adjuvant Adriamycin/Cytoxan x4 cycles followed by tamoxifen x5 years, then Femara x5 years, completed in October 2008. Since this time she has been followed by close observation. [de-identified] : infiltrating well-differentiated (tubular)carcinoma; microscopic focus of intraductal carcinoma; lobular carcinoma in situ. [de-identified] : CA 27.29 [de-identified] : 83 year old woman here for yearly follow-up for right breast cancer.  Her last office visit was on 12/10/16. She feels very well without any complaints. Patient had a uterine polyp removed 12/21/2016.  Patient reports the pathology was benign.  She reports a good energy level and appetite without any recent weight loss. She denies any recent infections, fever or chills.  She denies any chest pain, SOB, abdominal pain or new bony pain.  She denies any vaginal bleeding.    She reports she had a complete metabolic profile in 12/2016 prior to surgery.  She is up-to-date with bone density (9/2016), flu shot (10/2016), Pneumovax (7/2015).  She recently saw Dr. Baltazar (11/2016).  She sees PMD every 3 months. She sees gynecology once per year and receives yearly pelvic sonograms (last 10/2016).      SInce office visit on 1/5/17, she had followup with breast surgeon on 11/27/17. She has good energy level and appetite. She follows with PCP every 3 months. She sees Gynecologist yearly. No fever, shortness of breath, abdominal pain.  Since office visit on 11/30/17, the patient continues to be monitored by PCP. She had recent lab work on 11/19/18. She has good energy level and good appetite. She was evaluated by breast surgeon on 11/15/18. She denies fever, cough, shortness of breath. She see Gyn yearly. Bone density ordered by PCP.   Since office visit on 11/28/18, the patient continues to be monitored by PCP every 3 months except when in [place]. She had recent lab work on 9/19/19, CBC with diff in normal range. She was evaluated by breast surgeon on 11/25/19. She has good energy level and good appetite. She denies fever, cough, shortness of breath. She see Gyn yearly, last on 10/14/19. Bone density ordered by PCP.   Since office visit on 12/26/19, she has good energy level and good appetite. She denies fever, cough, shortness of breath. Seen by Surg Onc on 6/21/21. Seen by Gyn on 8/3/21 and had pap, she states exam was normal. Colonoscopy in May 2016, when she was 76, by Dr. Najera was unremarkable.No specific follow-up needed since she is asymptomatic, over 76, without a personal or family history of CRC. She had bone density a few weeks ago.   08/25/22: Since last office visit on 8/26/21, she has good energy level and good appetite.  She underwent bilateral breast ultrasound(7/19/22)- no evidence of malignancy.  Seen by Surg Onc on 7/27/22. She has Gyn appointment in October 2022. She follows with PCP every 3 months, this is also her GI physician who informed her that colonoscopy not required- see history above. She denies fever, cough, shortness of breath.   08/31/23: She was seen by Surg Onc on 7/24/23, and sent for MRI Breasts on 8/4/23, per Surg Onc. She follows with Gyn yearly, went in June 2023. She sees her PCP every 3 months. She denies fever, cough, shortness of breath.

## 2023-08-31 NOTE — REVIEW OF SYSTEMS
[Joint Pain] : joint pain [Joint Stiffness] : joint stiffness [Fever] : no fever [Chills] : no chills [Fatigue] : no fatigue [Mucosal Pain] : no mucosal pain [Lower Ext Edema] : no lower extremity edema [Shortness Of Breath] : no shortness of breath [Cough] : no cough [Abdominal Pain] : no abdominal pain [Vomiting] : no vomiting [Skin Rash] : no skin rash [Dizziness] : no dizziness [Fainting] : no fainting [Easy Bleeding] : no tendency for easy bleeding [Easy Bruising] : no tendency for easy bruising [FreeTextEntry7] : Good appetite [FreeTextEntry9] : mild in fingers, both knees, sometimes ankles and feet secondary to arthritis

## 2024-09-05 ENCOUNTER — RESULT REVIEW (OUTPATIENT)
Age: 84
End: 2024-09-05

## 2024-09-05 ENCOUNTER — APPOINTMENT (OUTPATIENT)
Dept: ULTRASOUND IMAGING | Facility: CLINIC | Age: 84
End: 2024-09-05
Payer: MEDICARE

## 2024-09-05 ENCOUNTER — OUTPATIENT (OUTPATIENT)
Dept: OUTPATIENT SERVICES | Facility: HOSPITAL | Age: 84
LOS: 1 days | End: 2024-09-05
Payer: MEDICARE

## 2024-09-05 DIAGNOSIS — Z00.00 ENCOUNTER FOR GENERAL ADULT MEDICAL EXAMINATION WITHOUT ABNORMAL FINDINGS: ICD-10-CM

## 2024-09-05 PROCEDURE — 76641 ULTRASOUND BREAST COMPLETE: CPT

## 2024-09-05 PROCEDURE — 76641 ULTRASOUND BREAST COMPLETE: CPT | Mod: 26,50,3G

## 2024-09-08 ENCOUNTER — NON-APPOINTMENT (OUTPATIENT)
Age: 84
End: 2024-09-08

## 2024-09-08 PROBLEM — C50.911 MALIGNANT NEOPLASM OF RIGHT BREAST IN FEMALE, ESTROGEN RECEPTOR POSITIVE, UNSPECIFIED SITE OF BREAST: Status: ACTIVE | Noted: 2024-09-08

## 2024-09-09 ENCOUNTER — APPOINTMENT (OUTPATIENT)
Dept: SURGICAL ONCOLOGY | Facility: CLINIC | Age: 84
End: 2024-09-09
Payer: MEDICARE

## 2024-09-09 VITALS
HEIGHT: 61 IN | OXYGEN SATURATION: 96 % | DIASTOLIC BLOOD PRESSURE: 72 MMHG | WEIGHT: 141 LBS | BODY MASS INDEX: 26.62 KG/M2 | RESPIRATION RATE: 17 BRPM | HEART RATE: 60 BPM | SYSTOLIC BLOOD PRESSURE: 188 MMHG

## 2024-09-09 DIAGNOSIS — Z17.0 MALIGNANT NEOPLASM OF UNSPECIFIED SITE OF RIGHT FEMALE BREAST: ICD-10-CM

## 2024-09-09 DIAGNOSIS — C50.911 MALIGNANT NEOPLASM OF UNSPECIFIED SITE OF RIGHT FEMALE BREAST: ICD-10-CM

## 2024-09-09 PROCEDURE — 99214 OFFICE O/P EST MOD 30 MIN: CPT

## 2024-09-09 NOTE — HISTORY OF PRESENT ILLNESS
[de-identified] : ***Now lives in [place].  84-year-old lady being seen for follow-up of RIGHT BREAST CANCER.  She is asymptomatic  March 1997: right breast conserving surgery for LN+ stage II cancer. + Adjuvant chemotherapy: Dr. Diann Ram.  Followed by anti-estrogen therapy Her medical oncologist is now Dr. Aretha YORK.  October 1997: Because of her Ashkenazi heritage and the presence of LCIS on the index surgery she chose to have a completion right and a prophylactic left mastectomy, with reconstruction by Dr. Mir López.  She is BRCA negative.  + Ashkenazi heritage.  No relatives with breast OR ovarian cancer.  + Family history of malignancy. Father: Lymphoma. A paternal second cousin also had cancer, but no other details are available   Her menarche was at age 13.  She's had 2 pregnancies, both delivered, the first is 25.   Her internist is Dr Lexie NAJERA.  + ALLERGIC: Sulfa medications  She does not have a pacemaker defibrillator. She takes no anticoagulants.  She takes a baby aspirin daily. Hypertension is managed with Diovan, metoprolol, and Norvasc. Lipitor to control hyperlipidemia.  Cardiology: Dr. Riki NAJERA.  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 Her internist has recommended a neurologist. She will be scheduling a consultation.   GYN: Dr. Tyler MONROE July 2023 visit was unremarkable Her gynecologist was Dr. Cosmo Chavez, a visit in October 2016 led the diagnosis of the uterine polyp, was addressed by  Dr. Kelton Castillo.   Colonoscopy in May 2016, when she was 76, by Dr. Najera was unremarkable. No specific follow-up needed since she is asymptomatic, over 76, without a personal or family history of CRC

## 2024-09-09 NOTE — ASSESSMENT
[FreeTextEntry1] : 2010 PET scan was unremarkable.  Today she is asymptomatic, with a normal examination  84-year-old lady.  Breast cancer follow-up.  She had bilateral mastectomies in 1997.  Today she is asymptomatic with no worrisome findings on physical examination.   July 2023: Bilateral breast sonograms at [place]: BI-RADS 2. + Bilateral monica-implant fluid, right > left.  August 4, 2023, she had an MRI of her breast at [facility]: BI-RADS 2. There is a trace amount of fluid around her implants bilaterally, but nothing worrisome.   Clinically doing well.  Have offered to continue to see her annually, sooner if needed.  Reviewed in detail, all questions answered.

## 2024-09-09 NOTE — REASON FOR VISIT
[Follow-Up Visit] : a follow-up visit for [Other: _____] : [unfilled] [FreeTextEntry2] : Right breast cancer treated with bilateral mastectomies in 1997.

## 2024-09-09 NOTE — PHYSICAL EXAM
[Normal] : supple, no neck mass and thyroid not enlarged [Normal Neck Lymph Nodes] : normal neck lymph nodes  [Normal Supraclavicular Lymph Nodes] : normal supraclavicular lymph nodes [Normal Axillary Lymph Nodes] : normal axillary lymph nodes [Normal] : normal appearance, no rash, nodules, vesicles, ulcers, erythema [de-identified] : below [de-identified] : Groins not examined

## 2024-09-09 NOTE — ASSESSMENT
[FreeTextEntry1] : 2010 PET scan was unremarkable.  Today she is asymptomatic, with a normal examination  84-year-old lady.  Breast cancer follow-up.  She had bilateral mastectomies in 1997.  Today she is asymptomatic with no worrisome findings on physical examination.   July 2023: Bilateral breast sonograms at [place]: BI-RADS 2. + Bilateral monica-implant fluid, right > left.  August 4, 2023, she had an MRI of her breast at [facility]: BI-RADS 2. There is a trace amount of fluid around her implants bilaterally, but nothing worrisome.   Clinically doing well.  Have offered to continue to see her annually, sooner if needed.  Reviewed in detail, all questions answered.

## 2024-09-09 NOTE — PHYSICAL EXAM
[Normal] : supple, no neck mass and thyroid not enlarged [Normal Neck Lymph Nodes] : normal neck lymph nodes  [Normal Supraclavicular Lymph Nodes] : normal supraclavicular lymph nodes [Normal Axillary Lymph Nodes] : normal axillary lymph nodes [Normal] : normal appearance, no rash, nodules, vesicles, ulcers, erythema [de-identified] : below [de-identified] : Groins not examined

## 2024-09-09 NOTE — HISTORY OF PRESENT ILLNESS
[de-identified] : ***Now lives in [place].  84-year-old lady being seen for follow-up of RIGHT BREAST CANCER.  She is asymptomatic  March 1997: right breast conserving surgery for LN+ stage II cancer. + Adjuvant chemotherapy: Dr. Diann Ram.  Followed by anti-estrogen therapy Her medical oncologist is now Dr. Aretha YORK.  October 1997: Because of her Ashkenazi heritage and the presence of LCIS on the index surgery she chose to have a completion right and a prophylactic left mastectomy, with reconstruction by Dr. Mir López.  She is BRCA negative.  + Ashkenazi heritage.  No relatives with breast OR ovarian cancer.  + Family history of malignancy. Father: Lymphoma. A paternal second cousin also had cancer, but no other details are available   Her menarche was at age 13.  She's had 2 pregnancies, both delivered, the first is 25.   Her internist is Dr Lexie NAJERA.  + ALLERGIC: Sulfa medications  She does not have a pacemaker defibrillator. She takes no anticoagulants.  She takes a baby aspirin daily. Hypertension is managed with Diovan, metoprolol, and Norvasc. Lipitor to control hyperlipidemia.  Cardiology: Dr. Riki NAJERA.  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 Her internist has recommended a neurologist. She will be scheduling a consultation.   GYN: Dr. Tyler MONROE July 2023 visit was unremarkable Her gynecologist was Dr. Cosmo Chavez, a visit in October 2016 led the diagnosis of the uterine polyp, was addressed by  Dr. Kelton Castillo.   Colonoscopy in May 2016, when she was 76, by Dr. Najera was unremarkable. No specific follow-up needed since she is asymptomatic, over 76, without a personal or family history of CRC

## 2024-09-09 NOTE — REVIEW OF SYSTEMS
[Negative] : Endocrine [FreeTextEntry5] :  Hypertension [FreeTextEntry7] : Sulfa allergy [FreeTextEntry1] : Breast cancer